# Patient Record
Sex: MALE | Race: WHITE
[De-identification: names, ages, dates, MRNs, and addresses within clinical notes are randomized per-mention and may not be internally consistent; named-entity substitution may affect disease eponyms.]

---

## 2019-04-21 ENCOUNTER — HOSPITAL ENCOUNTER (EMERGENCY)
Dept: HOSPITAL 25 - UCEAST | Age: 24
Discharge: HOME | End: 2019-04-21
Payer: COMMERCIAL

## 2019-04-21 VITALS — DIASTOLIC BLOOD PRESSURE: 88 MMHG | SYSTOLIC BLOOD PRESSURE: 152 MMHG

## 2019-04-21 DIAGNOSIS — R05: ICD-10-CM

## 2019-04-21 DIAGNOSIS — J32.9: Primary | ICD-10-CM

## 2019-04-21 PROCEDURE — 99212 OFFICE O/P EST SF 10 MIN: CPT

## 2019-04-21 PROCEDURE — G0463 HOSPITAL OUTPT CLINIC VISIT: HCPCS

## 2019-04-21 PROCEDURE — 71046 X-RAY EXAM CHEST 2 VIEWS: CPT

## 2019-04-21 PROCEDURE — 87651 STREP A DNA AMP PROBE: CPT

## 2019-04-21 NOTE — UC
Throat Pain/Nasal Maximilian HPI





- HPI Summary


HPI Summary: 





24 yo male presents with runny nose, productive cough with yellow/green sputum, 

and sore throat for the last 3 days. He has been taking benadryl OTC with no 

change in his symptoms. Denies fever, rash, SOB, n/v. He does not smoke. 





- History of Current Complaint


Chief Complaint: UCRespiratory


Stated Complaint: SORE THROAT COUGH


Time Seen by Provider: 04/21/19 10:50


Hx Obtained From: Patient


Onset/Duration: Gradual Onset


Cough: Productive





- Allergies/Home Medications


Allergies/Adverse Reactions: 


 Allergies











Allergy/AdvReac Type Severity Reaction Status Date / Time


 


No Known Allergies Allergy   Verified 04/21/19 10:52











Home Medications: 


 Home Medications





Dm/PE/Acetaminophen/Doxylamine [Vicks Dayquil/Nyquil Cold] 1 mis PO 04/21/19 [

History]


diphenhydrAMINE HCl [Benadryl Allergy 25 MG CAP] 50 mg PO 04/21/19 [History]











PMH/Surg Hx/FS Hx/Imm Hx





- Additional Past Medical History


Additional PMH: 





None





- Surgical History


Surgical History: Yes


Surgery Procedure, Year, and Place: WISDOM TEETH





- Family History


Known Family History: Positive: None





- Social History


Lives: With Family


Alcohol Use: Occasionally


Substance Use Type: None


Smoking Status (MU): Never Smoked Tobacco





Review of Systems


All Other Systems Reviewed And Are Negative: Yes


Constitutional: Positive: Negative


Skin: Positive: Negative


Eyes: Positive: Negative


ENT: Positive: Nasal Discharge


Respiratory: Positive: Cough


Cardiovascular: Positive: Negative


Gastrointestinal: Positive: Negative


Neurological: Positive: Negative


Psychological: Positive: Negative





Physical Exam





- Summary


Physical Exam Summary: 





GENERAL: NAD. WDWN. No pain distress.


SKIN: No rashes, sores, lesions, or open wounds.


HEENT:


            Head: AT/NC


            Eyes: EOM intact. Conjunctiva clear without inflammation or 

discharge.


            Ears: Hearing grossly normal. TMs intact, no bulging, erythema, or 

edema. 


            Nose: Nasal mucosa pink and moist. NTTP maxillary and frontal 

sinus. 


            Throat: Posterior oropharynx without exudates, erythema, or 

tonsillar enlargement.  Uvula midline.


NECK: Supple. Nontender. No lymphadenopathy. 


CHEST:  CTAB. No r/r/w. No accessory muscle use. Breathing comfortably and in 

no distress.


CV:  RRR. Without m/r/g. Pulses intact. Cap refill <2seconds


NEURO: Alert. 


PSYCH: Age appropriate behavior.


Triage Information Reviewed: Yes


Vital Signs: 





Vital Signs:











Temp Pulse Resp BP Pulse Ox


 


 98.6 F   84   18   152/88   99 


 


 04/21/19 10:50  04/21/19 10:50  04/21/19 10:50  04/21/19 10:50  04/21/19 10:50








 Laboratory Tests











  04/21/19





  10:58


 


Group A Strep Rapid  Negative











Vital Signs Reviewed: Yes





Throat Pain/Nasal Course/Dx





- Course


Course Of Treatment: 





POC strep negative.


CXR: IMPRESSION: No active cardiopulmonary disease is noted.


Discussed results with pt. Suspect viral illness/allergies and will rx for 

claritin and mucinex. Advised to be rechecked if symptoms do not improve.








- Differential Dx/Diagnosis


Provider Diagnosis: 


 Rhinosinusitis








Discharge





- Sign-Out/Discharge


Documenting (check all that apply): Patient Departure


All imaging exams completed and their final reports reviewed: Yes





- Discharge Plan


Condition: Stable


Disposition: HOME


Prescriptions: 


guaiFENesin ER TAB [Mucinex*] 600 mg PO BID #14 tab.er


Loratadine [Claritin] 10 mg PO DAILY #14 tab


Patient Education Materials:  Rhinosinusitis (DC)


Referrals: 


Edith Valencia DO [Primary Care Provider] - 


Additional Instructions: 


If you develop a fever, shortness of breath, chest pain, new or worsening 

symptoms - please call your PCP or go to the ED.


 








- Billing Disposition and Condition


Condition: STABLE


Disposition: Home